# Patient Record
Sex: MALE | Race: OTHER | HISPANIC OR LATINO | Employment: UNEMPLOYED | URBAN - METROPOLITAN AREA
[De-identification: names, ages, dates, MRNs, and addresses within clinical notes are randomized per-mention and may not be internally consistent; named-entity substitution may affect disease eponyms.]

---

## 2019-10-26 ENCOUNTER — APPOINTMENT (EMERGENCY)
Dept: RADIOLOGY | Facility: HOSPITAL | Age: 54
End: 2019-10-26

## 2019-10-26 ENCOUNTER — HOSPITAL ENCOUNTER (OUTPATIENT)
Facility: HOSPITAL | Age: 54
Setting detail: OBSERVATION
Discharge: HOME | End: 2019-10-27
Attending: EMERGENCY MEDICINE | Admitting: FAMILY MEDICINE

## 2019-10-26 DIAGNOSIS — R10.9 ABDOMINAL PAIN: Primary | ICD-10-CM

## 2019-10-26 DIAGNOSIS — F17.200 NICOTINE DEPENDENCE: ICD-10-CM

## 2019-10-26 DIAGNOSIS — R10.13 EPIGASTRIC PAIN: ICD-10-CM

## 2019-10-26 DIAGNOSIS — R11.2 NAUSEA & VOMITING: ICD-10-CM

## 2019-10-26 PROBLEM — K29.50 CHRONIC GASTRITIS: Status: ACTIVE | Noted: 2019-10-26

## 2019-10-26 PROBLEM — R91.1 PULMONARY NODULE: Status: ACTIVE | Noted: 2019-10-26

## 2019-10-26 PROBLEM — J92.9 PLEURAL THICKENING: Status: ACTIVE | Noted: 2019-10-26

## 2019-10-26 PROBLEM — F12.10 MARIJUANA ABUSE: Status: ACTIVE | Noted: 2019-10-26

## 2019-10-26 PROBLEM — E66.9 OBESITY: Status: ACTIVE | Noted: 2019-10-26

## 2019-10-26 PROBLEM — R93.89 FILLING DEFECT ON IMAGING STUDY: Status: ACTIVE | Noted: 2019-10-26

## 2019-10-26 LAB
ALBUMIN SERPL BCP-MCNC: 3.7 G/DL (ref 3.5–5)
ALP SERPL-CCNC: 82 U/L (ref 46–116)
ALT SERPL W P-5'-P-CCNC: 32 U/L (ref 12–78)
AMPHETAMINES SERPL QL SCN: NEGATIVE
ANION GAP SERPL CALCULATED.3IONS-SCNC: 7 MMOL/L (ref 4–13)
APTT PPP: 27 SECONDS (ref 23–37)
AST SERPL W P-5'-P-CCNC: 20 U/L (ref 5–45)
BACTERIA UR QL AUTO: ABNORMAL /HPF
BARBITURATES UR QL: NEGATIVE
BASOPHILS # BLD AUTO: 0.05 THOUSANDS/ΜL (ref 0–0.1)
BASOPHILS NFR BLD AUTO: 1 % (ref 0–1)
BENZODIAZ UR QL: NEGATIVE
BILIRUB SERPL-MCNC: 0.3 MG/DL (ref 0.2–1)
BILIRUB UR QL STRIP: NEGATIVE
BUN SERPL-MCNC: 20 MG/DL (ref 5–25)
CALCIUM SERPL-MCNC: 8.7 MG/DL (ref 8.3–10.1)
CHLORIDE SERPL-SCNC: 102 MMOL/L (ref 100–108)
CHOLEST SERPL-MCNC: 119 MG/DL (ref 50–200)
CLARITY UR: CLEAR
CO2 SERPL-SCNC: 27 MMOL/L (ref 21–32)
COCAINE UR QL: NEGATIVE
COLOR UR: YELLOW
CREAT SERPL-MCNC: 0.88 MG/DL (ref 0.6–1.3)
EOSINOPHIL # BLD AUTO: 0.28 THOUSAND/ΜL (ref 0–0.61)
EOSINOPHIL NFR BLD AUTO: 3 % (ref 0–6)
ERYTHROCYTE [DISTWIDTH] IN BLOOD BY AUTOMATED COUNT: 13.7 % (ref 11.6–15.1)
GFR SERPL CREATININE-BSD FRML MDRD: 98 ML/MIN/1.73SQ M
GLUCOSE SERPL-MCNC: 119 MG/DL (ref 65–140)
GLUCOSE UR STRIP-MCNC: NEGATIVE MG/DL
HCT VFR BLD AUTO: 43.9 % (ref 36.5–49.3)
HDLC SERPL-MCNC: 29 MG/DL
HGB BLD-MCNC: 14 G/DL (ref 12–17)
HGB UR QL STRIP.AUTO: ABNORMAL
HYALINE CASTS #/AREA URNS LPF: ABNORMAL /LPF
IMM GRANULOCYTES # BLD AUTO: 0.03 THOUSAND/UL (ref 0–0.2)
IMM GRANULOCYTES NFR BLD AUTO: 0 % (ref 0–2)
INR PPP: 1.02 (ref 0.91–1.09)
KETONES UR STRIP-MCNC: NEGATIVE MG/DL
LDLC SERPL CALC-MCNC: 56 MG/DL (ref 0–100)
LEUKOCYTE ESTERASE UR QL STRIP: NEGATIVE
LIPASE SERPL-CCNC: 395 U/L (ref 73–393)
LYMPHOCYTES # BLD AUTO: 3.29 THOUSANDS/ΜL (ref 0.6–4.47)
LYMPHOCYTES NFR BLD AUTO: 33 % (ref 14–44)
MCH RBC QN AUTO: 29.4 PG (ref 26.8–34.3)
MCHC RBC AUTO-ENTMCNC: 31.9 G/DL (ref 31.4–37.4)
MCV RBC AUTO: 92 FL (ref 82–98)
METHADONE UR QL: NEGATIVE
MONOCYTES # BLD AUTO: 1.07 THOUSAND/ΜL (ref 0.17–1.22)
MONOCYTES NFR BLD AUTO: 11 % (ref 4–12)
MUCOUS THREADS UR QL AUTO: ABNORMAL
NEUTROPHILS # BLD AUTO: 5.12 THOUSANDS/ΜL (ref 1.85–7.62)
NEUTS SEG NFR BLD AUTO: 52 % (ref 43–75)
NITRITE UR QL STRIP: NEGATIVE
NON-SQ EPI CELLS URNS QL MICRO: ABNORMAL /HPF
NRBC BLD AUTO-RTO: 0 /100 WBCS
OPIATES UR QL SCN: POSITIVE
PCP UR QL: NEGATIVE
PH UR STRIP.AUTO: 7.5 [PH]
PLATELET # BLD AUTO: 291 THOUSANDS/UL (ref 149–390)
PMV BLD AUTO: 9.6 FL (ref 8.9–12.7)
POTASSIUM SERPL-SCNC: 4.2 MMOL/L (ref 3.5–5.3)
PROT SERPL-MCNC: 7.3 G/DL (ref 6.4–8.2)
PROT UR STRIP-MCNC: NEGATIVE MG/DL
PROTHROMBIN TIME: 11 SECONDS (ref 9.8–12)
RBC # BLD AUTO: 4.76 MILLION/UL (ref 3.88–5.62)
RBC #/AREA URNS AUTO: ABNORMAL /HPF
SODIUM SERPL-SCNC: 136 MMOL/L (ref 136–145)
SP GR UR STRIP.AUTO: 1.01 (ref 1–1.03)
THC UR QL: POSITIVE
TRIGL SERPL-MCNC: 170 MG/DL
TROPONIN I SERPL-MCNC: <0.02 NG/ML
UROBILINOGEN UR QL STRIP.AUTO: 0.2 E.U./DL
WBC # BLD AUTO: 9.84 THOUSAND/UL (ref 4.31–10.16)
WBC #/AREA URNS AUTO: ABNORMAL /HPF

## 2019-10-26 PROCEDURE — 74177 CT ABD & PELVIS W/CONTRAST: CPT

## 2019-10-26 PROCEDURE — 96375 TX/PRO/DX INJ NEW DRUG ADDON: CPT

## 2019-10-26 PROCEDURE — 85730 THROMBOPLASTIN TIME PARTIAL: CPT | Performed by: EMERGENCY MEDICINE

## 2019-10-26 PROCEDURE — 99220 PR INITIAL OBSERVATION CARE/DAY 70 MINUTES: CPT | Performed by: INTERNAL MEDICINE

## 2019-10-26 PROCEDURE — 94664 DEMO&/EVAL PT USE INHALER: CPT

## 2019-10-26 PROCEDURE — 83690 ASSAY OF LIPASE: CPT | Performed by: EMERGENCY MEDICINE

## 2019-10-26 PROCEDURE — 96361 HYDRATE IV INFUSION ADD-ON: CPT

## 2019-10-26 PROCEDURE — 83036 HEMOGLOBIN GLYCOSYLATED A1C: CPT | Performed by: NURSE PRACTITIONER

## 2019-10-26 PROCEDURE — 80053 COMPREHEN METABOLIC PANEL: CPT | Performed by: EMERGENCY MEDICINE

## 2019-10-26 PROCEDURE — 81001 URINALYSIS AUTO W/SCOPE: CPT | Performed by: NURSE PRACTITIONER

## 2019-10-26 PROCEDURE — 71260 CT THORAX DX C+: CPT

## 2019-10-26 PROCEDURE — 96374 THER/PROPH/DIAG INJ IV PUSH: CPT

## 2019-10-26 PROCEDURE — 36415 COLL VENOUS BLD VENIPUNCTURE: CPT | Performed by: EMERGENCY MEDICINE

## 2019-10-26 PROCEDURE — 71045 X-RAY EXAM CHEST 1 VIEW: CPT

## 2019-10-26 PROCEDURE — 84484 ASSAY OF TROPONIN QUANT: CPT | Performed by: EMERGENCY MEDICINE

## 2019-10-26 PROCEDURE — 96372 THER/PROPH/DIAG INJ SC/IM: CPT

## 2019-10-26 PROCEDURE — 80061 LIPID PANEL: CPT | Performed by: NURSE PRACTITIONER

## 2019-10-26 PROCEDURE — 99285 EMERGENCY DEPT VISIT HI MDM: CPT

## 2019-10-26 PROCEDURE — 80307 DRUG TEST PRSMV CHEM ANLYZR: CPT | Performed by: NURSE PRACTITIONER

## 2019-10-26 PROCEDURE — 85610 PROTHROMBIN TIME: CPT | Performed by: EMERGENCY MEDICINE

## 2019-10-26 PROCEDURE — 94640 AIRWAY INHALATION TREATMENT: CPT

## 2019-10-26 PROCEDURE — 94760 N-INVAS EAR/PLS OXIMETRY 1: CPT

## 2019-10-26 PROCEDURE — 85025 COMPLETE CBC W/AUTO DIFF WBC: CPT | Performed by: EMERGENCY MEDICINE

## 2019-10-26 PROCEDURE — 93005 ELECTROCARDIOGRAM TRACING: CPT

## 2019-10-26 RX ORDER — PANTOPRAZOLE SODIUM 40 MG/1
40 TABLET, DELAYED RELEASE ORAL
Status: DISCONTINUED | OUTPATIENT
Start: 2019-10-27 | End: 2019-10-26

## 2019-10-26 RX ORDER — SUCRALFATE ORAL 1 G/10ML
1000 SUSPENSION ORAL
Status: DISCONTINUED | OUTPATIENT
Start: 2019-10-26 | End: 2019-10-27 | Stop reason: HOSPADM

## 2019-10-26 RX ORDER — POLYETHYLENE GLYCOL 3350 17 G/17G
17 POWDER, FOR SOLUTION ORAL DAILY PRN
Status: DISCONTINUED | OUTPATIENT
Start: 2019-10-26 | End: 2019-10-27 | Stop reason: HOSPADM

## 2019-10-26 RX ORDER — ONDANSETRON 2 MG/ML
4 INJECTION INTRAMUSCULAR; INTRAVENOUS EVERY 6 HOURS PRN
Status: DISCONTINUED | OUTPATIENT
Start: 2019-10-26 | End: 2019-10-27 | Stop reason: HOSPADM

## 2019-10-26 RX ORDER — METOCLOPRAMIDE HYDROCHLORIDE 5 MG/ML
10 INJECTION INTRAMUSCULAR; INTRAVENOUS ONCE
Status: COMPLETED | OUTPATIENT
Start: 2019-10-26 | End: 2019-10-26

## 2019-10-26 RX ORDER — PROMETHAZINE HYDROCHLORIDE 25 MG/ML
12.5 INJECTION, SOLUTION INTRAMUSCULAR; INTRAVENOUS ONCE
Status: COMPLETED | OUTPATIENT
Start: 2019-10-26 | End: 2019-10-26

## 2019-10-26 RX ORDER — AMLODIPINE BESYLATE 5 MG/1
5 TABLET ORAL DAILY
Status: DISCONTINUED | OUTPATIENT
Start: 2019-10-27 | End: 2019-10-27 | Stop reason: HOSPADM

## 2019-10-26 RX ORDER — HYDROMORPHONE HCL/PF 1 MG/ML
1 SYRINGE (ML) INJECTION ONCE
Status: COMPLETED | OUTPATIENT
Start: 2019-10-26 | End: 2019-10-26

## 2019-10-26 RX ORDER — PANTOPRAZOLE SODIUM 40 MG/1
40 TABLET, DELAYED RELEASE ORAL
Status: DISCONTINUED | OUTPATIENT
Start: 2019-10-26 | End: 2019-10-27 | Stop reason: HOSPADM

## 2019-10-26 RX ORDER — DICYCLOMINE HYDROCHLORIDE 10 MG/1
20 CAPSULE ORAL
Status: DISCONTINUED | OUTPATIENT
Start: 2019-10-26 | End: 2019-10-27 | Stop reason: HOSPADM

## 2019-10-26 RX ORDER — ACETAMINOPHEN 325 MG/1
650 TABLET ORAL EVERY 6 HOURS PRN
Status: DISCONTINUED | OUTPATIENT
Start: 2019-10-26 | End: 2019-10-27 | Stop reason: HOSPADM

## 2019-10-26 RX ORDER — HYDROMORPHONE HCL/PF 1 MG/ML
0.5 SYRINGE (ML) INJECTION EVERY 6 HOURS PRN
Status: DISCONTINUED | OUTPATIENT
Start: 2019-10-26 | End: 2019-10-26

## 2019-10-26 RX ORDER — ALBUTEROL SULFATE 2.5 MG/3ML
2.5 SOLUTION RESPIRATORY (INHALATION) EVERY 6 HOURS PRN
Status: DISCONTINUED | OUTPATIENT
Start: 2019-10-26 | End: 2019-10-27 | Stop reason: HOSPADM

## 2019-10-26 RX ORDER — HEPARIN SODIUM 5000 [USP'U]/ML
5000 INJECTION, SOLUTION INTRAVENOUS; SUBCUTANEOUS EVERY 8 HOURS SCHEDULED
Status: DISCONTINUED | OUTPATIENT
Start: 2019-10-26 | End: 2019-10-27 | Stop reason: HOSPADM

## 2019-10-26 RX ORDER — LORAZEPAM 0.5 MG/1
0.5 TABLET ORAL EVERY 8 HOURS PRN
Status: DISCONTINUED | OUTPATIENT
Start: 2019-10-26 | End: 2019-10-27 | Stop reason: HOSPADM

## 2019-10-26 RX ORDER — MORPHINE SULFATE 4 MG/ML
4 INJECTION, SOLUTION INTRAMUSCULAR; INTRAVENOUS ONCE
Status: COMPLETED | OUTPATIENT
Start: 2019-10-26 | End: 2019-10-26

## 2019-10-26 RX ORDER — PROMETHAZINE HYDROCHLORIDE 25 MG/ML
12.5 INJECTION, SOLUTION INTRAMUSCULAR; INTRAVENOUS EVERY 8 HOURS PRN
Status: DISCONTINUED | OUTPATIENT
Start: 2019-10-26 | End: 2019-10-27 | Stop reason: HOSPADM

## 2019-10-26 RX ORDER — NICOTINE 21 MG/24HR
1 PATCH, TRANSDERMAL 24 HOURS TRANSDERMAL DAILY
Status: DISCONTINUED | OUTPATIENT
Start: 2019-10-26 | End: 2019-10-27 | Stop reason: HOSPADM

## 2019-10-26 RX ORDER — KETOROLAC TROMETHAMINE 30 MG/ML
30 INJECTION, SOLUTION INTRAMUSCULAR; INTRAVENOUS ONCE
Status: COMPLETED | OUTPATIENT
Start: 2019-10-26 | End: 2019-10-26

## 2019-10-26 RX ORDER — ONDANSETRON 2 MG/ML
4 INJECTION INTRAMUSCULAR; INTRAVENOUS ONCE
Status: COMPLETED | OUTPATIENT
Start: 2019-10-26 | End: 2019-10-26

## 2019-10-26 RX ORDER — KETOROLAC TROMETHAMINE 30 MG/ML
15 INJECTION, SOLUTION INTRAMUSCULAR; INTRAVENOUS EVERY 6 HOURS PRN
Status: DISCONTINUED | OUTPATIENT
Start: 2019-10-26 | End: 2019-10-26

## 2019-10-26 RX ORDER — MAGNESIUM HYDROXIDE/ALUMINUM HYDROXICE/SIMETHICONE 120; 1200; 1200 MG/30ML; MG/30ML; MG/30ML
30 SUSPENSION ORAL EVERY 4 HOURS PRN
Status: DISCONTINUED | OUTPATIENT
Start: 2019-10-26 | End: 2019-10-27 | Stop reason: HOSPADM

## 2019-10-26 RX ORDER — SODIUM CHLORIDE 9 MG/ML
100 INJECTION, SOLUTION INTRAVENOUS CONTINUOUS
Status: DISCONTINUED | OUTPATIENT
Start: 2019-10-26 | End: 2019-10-27

## 2019-10-26 RX ORDER — CLONIDINE HYDROCHLORIDE 0.1 MG/1
0.1 TABLET ORAL EVERY 8 HOURS SCHEDULED
Status: DISCONTINUED | OUTPATIENT
Start: 2019-10-26 | End: 2019-10-26

## 2019-10-26 RX ORDER — KETOROLAC TROMETHAMINE 30 MG/ML
15 INJECTION, SOLUTION INTRAMUSCULAR; INTRAVENOUS EVERY 6 HOURS PRN
Status: DISCONTINUED | OUTPATIENT
Start: 2019-10-26 | End: 2019-10-27 | Stop reason: HOSPADM

## 2019-10-26 RX ADMIN — HEPARIN SODIUM 5000 UNITS: 5000 INJECTION INTRAVENOUS; SUBCUTANEOUS at 22:04

## 2019-10-26 RX ADMIN — KETOROLAC TROMETHAMINE 30 MG: 30 INJECTION INTRAMUSCULAR; INTRAVENOUS at 14:13

## 2019-10-26 RX ADMIN — PROMETHAZINE HYDROCHLORIDE 12.5 MG: 25 INJECTION INTRAMUSCULAR; INTRAVENOUS at 20:17

## 2019-10-26 RX ADMIN — METOCLOPRAMIDE 10 MG: 5 INJECTION, SOLUTION INTRAMUSCULAR; INTRAVENOUS at 17:20

## 2019-10-26 RX ADMIN — SUCRALFATE 1000 MG: 1 SUSPENSION ORAL at 19:50

## 2019-10-26 RX ADMIN — DICYCLOMINE HYDROCHLORIDE 20 MG: 10 CAPSULE ORAL at 19:50

## 2019-10-26 RX ADMIN — HYDROMORPHONE HYDROCHLORIDE 1 MG: 1 INJECTION, SOLUTION INTRAMUSCULAR; INTRAVENOUS; SUBCUTANEOUS at 17:22

## 2019-10-26 RX ADMIN — ALBUTEROL SULFATE 2.5 MG: 2.5 SOLUTION RESPIRATORY (INHALATION) at 20:34

## 2019-10-26 RX ADMIN — ONDANSETRON 4 MG: 2 INJECTION INTRAMUSCULAR; INTRAVENOUS at 14:12

## 2019-10-26 RX ADMIN — IOHEXOL 100 ML: 350 INJECTION, SOLUTION INTRAVENOUS at 15:22

## 2019-10-26 RX ADMIN — SODIUM CHLORIDE 100 ML/HR: 0.9 INJECTION, SOLUTION INTRAVENOUS at 19:51

## 2019-10-26 RX ADMIN — MORPHINE SULFATE 4 MG: 4 INJECTION INTRAVENOUS at 16:02

## 2019-10-26 RX ADMIN — NICOTINE 1 PATCH: 21 PATCH, EXTENDED RELEASE TRANSDERMAL at 19:50

## 2019-10-26 RX ADMIN — PROMETHAZINE HYDROCHLORIDE 12.5 MG: 25 INJECTION INTRAMUSCULAR; INTRAVENOUS at 15:11

## 2019-10-26 RX ADMIN — SODIUM CHLORIDE 1000 ML: 0.9 INJECTION, SOLUTION INTRAVENOUS at 14:11

## 2019-10-26 RX ADMIN — PANTOPRAZOLE SODIUM 40 MG: 40 TABLET, DELAYED RELEASE ORAL at 20:17

## 2019-10-26 NOTE — ED NOTES
Pt ringing call bell asking for medication, c/o continued nausea & requesting pain medication  Pt specifically states he is "not looking for drugs"  Awaiting physician orders        Christina Arcos RN  10/26/19 Lois Severino 86 [address], TOMASZ  10/26/19 2339

## 2019-10-26 NOTE — ASSESSMENT & PLAN NOTE
Questionable cannabinoid hyperemesis syndrome vs opioid withdrawal as patient had diarrhea, vomiting, and diaphoresis   · Check a urine drug screen  · Clonidine 0 1 mg q8h, IVF, Ativan PRN   · Supportive care  · Counseled on marijuana cessation

## 2019-10-26 NOTE — ASSESSMENT & PLAN NOTE
CT chest: 3 mm noncalcified left upper lobe pulmonary nodule  Based on current Fleischner Society 2017 Guidelines on incidental pulmonary nodule, no routine follow-up is needed if the patient is considered low risk for lung cancer  If the patient is considered high risk for lung cancer, 12 month follow-up non-contrast chest CT is recommended    · Outpatient follow-up  · Nicotine cessation

## 2019-10-26 NOTE — ASSESSMENT & PLAN NOTE
Patient reports daily marijuana use at bedtime  · Check a urine drug screen  · Counseled on marijuana cessation

## 2019-10-26 NOTE — ASSESSMENT & PLAN NOTE
CTA chest done pelvis: 'Collateral vessels in the left posterior chest with questionable narrowing/filling defect of the left subclavian vein (series 2 image 5 through 7)   Consider further evaluation with vascular ultrasound to exclude filling defect/thrombus versus mixing artifact in this region '  · Patient with no discomfort or upper extremity swelling  · Will check a D-dimer  · Obtain left upper extremity venous duplex inpatient versus outpatient

## 2019-10-26 NOTE — ED PROVIDER NOTES
History  Chief Complaint   Patient presents with    Abdominal Pain     c/o 20 minute onset of abdominal pain radiating up into his chest   squad states "patient was passing out for a few seconds at a time" in the ambulance  Patient grunting loudly and hyperventilating on arrival   also c/o nausea   Chest Pain     Patient is a 50-year-old male reportedly lives [facility] it was driving home from [place] visiting his son and he developed severe epigastric abdominal pain that was associated with nausea but no vomiting  Patient was brought in by squad, the patient denies any chest pain or shortness of breath  Patient has a very vague history, he states he has had similar symptoms in the past has been seen in hospitals in [place] and in [place] for reported the a ruptured diverticulitis, possibly kidney stones he states he has history of severe reflux  Patient states he had coffee this morning when he started driving and he knew he was going to have a problem  The pain did not start right away if the coffee but soon afterwards  Patient is hyperventilating upon entrance to the emergency room  Prior to Admission Medications   Prescriptions Last Dose Informant Patient Reported? Taking? AMLODIPINE BESYLATE PO 10/25/2019 at Unknown time  Yes Yes   Sig: Take by mouth   LISINOPRIL PO 10/25/2019 at Unknown time  Yes Yes   Sig: Take by mouth   Naproxen Sodium (ALEVE PO) 10/25/2019 at Unknown time  Yes Yes   Sig: Take by mouth daily   PANTOPRAZOLE SODIUM PO 10/25/2019 at Unknown time  Yes Yes   Sig: Take by mouth      Facility-Administered Medications: None       Past Medical History:   Diagnosis Date    Arthritis     Asthma     Hypertension        Past Surgical History:   Procedure Laterality Date    ABDOMINAL SURGERY         History reviewed  No pertinent family history  I have reviewed and agree with the history as documented      Social History     Tobacco Use    Smoking status: Current Every Day Smoker     Packs/day: 1 00     Types: Cigarettes    Smokeless tobacco: Current User   Substance Use Topics    Alcohol use: Never     Frequency: Never    Drug use: Yes     Types: Marijuana     Comment: medicinal marijuana daily        Review of Systems   Constitutional: Positive for diaphoresis  Negative for fever  HENT: Negative for facial swelling and trouble swallowing  Eyes: Negative for photophobia and visual disturbance  Respiratory: Negative for chest tightness and shortness of breath  Cardiovascular: Negative for chest pain, palpitations and leg swelling  Gastrointestinal: Positive for abdominal pain and nausea  Negative for vomiting  Genitourinary: Negative for dysuria and flank pain  Musculoskeletal: Negative for back pain and neck pain  Skin: Negative  Neurological: Negative for weakness and numbness  Hematological: Negative  Psychiatric/Behavioral: Negative          Physical Exam  Physical Exam    Vital Signs  ED Triage Vitals [10/26/19 1407]   Temperature Pulse Respirations Blood Pressure SpO2   (!) 97 °F (36 1 °C) (!) 110 (!) 36 (!) 151/110 96 %      Temp Source Heart Rate Source Patient Position - Orthostatic VS BP Location FiO2 (%)   Tympanic Monitor Lying Right arm --      Pain Score       Worst Possible Pain           Vitals:    10/26/19 1820 10/26/19 2037 10/26/19 2316 10/27/19 0803   BP: 135/90  119/84 125/78   Pulse: 96 94 79 86   Patient Position - Orthostatic VS:    Lying         Visual Acuity      ED Medications  Medications   amLODIPine (NORVASC) tablet 5 mg (5 mg Oral Not Given 10/27/19 0913)   ondansetron (ZOFRAN) injection 4 mg (4 mg Intravenous Given 10/27/19 0917)   heparin (porcine) subcutaneous injection 5,000 Units (5,000 Units Subcutaneous Given 10/27/19 0615)   acetaminophen (TYLENOL) tablet 650 mg (has no administration in time range)   polyethylene glycol (MIRALAX) packet 17 g (has no administration in time range)   nicotine (NICODERM CQ) 21 mg/24 hr TD 24 hr patch 1 patch (1 patch Transdermal Medication Applied 10/27/19 0917)   sucralfate (CARAFATE) oral suspension 1,000 mg (1,000 mg Oral Given 10/27/19 0615)   promethazine (PHENERGAN) injection 12 5 mg (12 5 mg Intravenous Given 10/26/19 2017)   ketorolac (TORADOL) injection 15 mg (15 mg Intravenous Given 10/27/19 0855)   dicyclomine (BENTYL) capsule 20 mg (20 mg Oral Given 10/27/19 0615)   aluminum-magnesium hydroxide-simethicone (MYLANTA) 592-073-31 mg/5 mL oral suspension 30 mL (30 mL Oral Given 10/27/19 0304)   LORazepam (ATIVAN) tablet 0 5 mg (0 5 mg Oral Not Given 10/27/19 0942)   pantoprazole (PROTONIX) EC tablet 40 mg (40 mg Oral Given 10/27/19 0615)   albuterol inhalation solution 2 5 mg (2 5 mg Nebulization Given 10/26/19 2034)   sodium chloride 0 9 % bolus 1,000 mL (0 mL Intravenous Stopped 10/26/19 1723)   ketorolac (TORADOL) injection 30 mg (30 mg Intravenous Given 10/26/19 1413)   ondansetron (ZOFRAN) injection 4 mg (4 mg Intravenous Given 10/26/19 1412)   promethazine (PHENERGAN) injection 12 5 mg (12 5 mg Intramuscular Given 10/26/19 1511)   iohexol (OMNIPAQUE) 350 MG/ML injection (MULTI-DOSE) 100 mL (100 mL Intravenous Given 10/26/19 1522)   morphine (PF) 4 mg/mL injection 4 mg (4 mg Intravenous Given 10/26/19 1602)   HYDROmorphone (DILAUDID) injection 1 mg (1 mg Intravenous Given 10/26/19 1722)   metoclopramide (REGLAN) injection 10 mg (10 mg Intravenous Given 10/26/19 1720)       Diagnostic Studies  Results Reviewed     Procedure Component Value Units Date/Time    Lipase [[id number]]  (Normal) Collected:  10/27/19 0535    Lab Status:  Final result Specimen:  Blood from Arm, Right Updated:  10/27/19 0634     Lipase 137 u/L     Amylase [[id number]]  (Normal) Collected:  10/27/19 0535    Lab Status:  Final result Specimen:  Blood from Arm, Right Updated:  10/27/19 0634     Amylase 64 IU/L     Hemoglobin A1C [[id number]] Collected:  10/26/19 1410    Lab Status:  Final result Specimen:  Blood from Arm, Left Updated:  10/27/19 0123     Hemoglobin A1C 6 2 %       mg/dl     Rapid drug screen, urine [[id number]]  (Abnormal) Collected:  10/26/19 2006    Lab Status:  Final result Specimen:  Urine, Other Updated:  10/26/19 2027     Amph/Meth UR Negative     Barbiturate Ur Negative     Benzodiazepine Urine Negative     Cocaine Urine Negative     Methadone Urine Negative     Opiate Urine Positive     PCP Ur Negative     THC Urine Positive    Narrative:       Presumptive report  If requested, specimen will be sent to reference lab for confirmation  FOR MEDICAL PURPOSES ONLY  IF CONFIRMATION NEEDED PLEASE CONTACT THE LAB WITHIN 5 DAYS      Drug Screen Cutoff Levels:  AMPHETAMINE/METHAMPHETAMINES  1000 ng/mL  BARBITURATES     200 ng/mL  BENZODIAZEPINES     200 ng/mL  COCAINE      300 ng/mL  METHADONE      300 ng/mL  OPIATES      300 ng/mL  PHENCYCLIDINE     25 ng/mL  THC       50 ng/mL      Urinalysis with microscopic [[id number]]  (Abnormal) Collected:  10/26/19 2006    Lab Status:  Final result Specimen:  Urine, Other Updated:  10/26/19 2022     Clarity, UA Clear     Color, UA Yellow     Specific Gravity, UA 1 010     pH, UA 7 5     Glucose, UA Negative mg/dl      Ketones, UA Negative mg/dl      Blood, UA Small     Protein, UA Negative mg/dl      Nitrite, UA Negative     Bilirubin, UA Negative     Urobilinogen, UA 0 2 E U /dl      Leukocytes, UA Negative     WBC, UA 0-1 /hpf      RBC, UA 4-10 /hpf      Hyaline Casts, UA 1-2 /lpf      Bacteria, UA Occasional /hpf      Epithelial Cells Occasional /hpf      MUCUS THREADS Occasional    Lipid Panel with Direct LDL reflex [[id number]]  (Abnormal) Collected:  10/26/19 1410    Lab Status:  Final result Specimen:  Blood from Arm, Left Updated:  10/26/19 1855     Cholesterol 119 mg/dL      Triglycerides 170 mg/dL      HDL, Direct 29 mg/dL      LDL Calculated 56 mg/dL     Troponin I [[id number]]  (Normal) Collected:  10/26/19 1410    Lab Status:  Final result Specimen:  Blood from Arm, Left Updated:  10/26/19 1434     Troponin I <0 02 ng/mL     APTT [[id number]]  (Normal) Collected:  10/26/19 1410    Lab Status:  Final result Specimen:  Blood from Arm, Left Updated:  10/26/19 1432     PTT 27 seconds     Protime-INR [[id number]]  (Normal) Collected:  10/26/19 1410    Lab Status:  Final result Specimen:  Blood from Arm, Left Updated:  10/26/19 1432     Protime 11 0 seconds      INR 1 02    Comprehensive metabolic panel [[id number]] Collected:  10/26/19 1410    Lab Status:  Final result Specimen:  Blood from Arm, Left Updated:  10/26/19 1431     Sodium 136 mmol/L      Potassium 4 2 mmol/L      Chloride 102 mmol/L      CO2 27 mmol/L      ANION GAP 7 mmol/L      BUN 20 mg/dL      Creatinine 0 88 mg/dL      Glucose 119 mg/dL      Calcium 8 7 mg/dL      AST 20 U/L      ALT 32 U/L      Alkaline Phosphatase 82 U/L      Total Protein 7 3 g/dL      Albumin 3 7 g/dL      Total Bilirubin 0 30 mg/dL      eGFR 98 ml/min/1 73sq m     Narrative:       [place] guidelines for Chronic Kidney Disease (CKD):     Stage 1 with normal or high GFR (GFR > 90 mL/min/1 73 square meters)    Stage 2 Mild CKD (GFR = 60-89 mL/min/1 73 square meters)    Stage 3A Moderate CKD (GFR = 45-59 mL/min/1 73 square meters)    Stage 3B Moderate CKD (GFR = 30-44 mL/min/1 73 square meters)    Stage 4 Severe CKD (GFR = 15-29 mL/min/1 73 square meters)    Stage 5 End Stage CKD (GFR <15 mL/min/1 73 square meters)  Note: GFR calculation is accurate only with a steady state creatinine    Lipase [[id number]]  (Abnormal) Collected:  10/26/19 1410    Lab Status:  Final result Specimen:  Blood from Arm, Left Updated:  10/26/19 1431     Lipase 395 u/L     CBC and differential [[id number]] Collected:  10/26/19 1410    Lab Status:  Final result Specimen:  Blood from Arm, Left Updated:  10/26/19 1414     WBC 9 84 Thousand/uL      RBC 4 76 Million/uL      Hemoglobin 14 0 g/dL      Hematocrit 43 9 %      MCV 92 fL      MCH 29 4 pg      MCHC 31 9 g/dL      RDW 13 7 %      MPV 9 6 fL      Platelets 893 Thousands/uL      nRBC 0 /100 WBCs      Neutrophils Relative 52 %      Immat GRANS % 0 %      Lymphocytes Relative 33 %      Monocytes Relative 11 %      Eosinophils Relative 3 %      Basophils Relative 1 %      Neutrophils Absolute 5 12 Thousands/µL      Immature Grans Absolute 0 03 Thousand/uL      Lymphocytes Absolute 3 29 Thousands/µL      Monocytes Absolute 1 07 Thousand/µL      Eosinophils Absolute 0 28 Thousand/µL      Basophils Absolute 0 05 Thousands/µL                  CT chest abdomen pelvis w contrast   Final Result by Heidi Mariee MD (10/26 6304)   1  3 mm noncalcified left upper lobe pulmonary nodule  Based on current Fleischner Society 2017 Guidelines on incidental pulmonary nodule, no routine follow-up is needed if the patient is considered low risk for lung cancer  If the patient is considered    high risk for lung cancer, 12 month follow-up non-contrast chest CT is recommended  2  Colonic diverticulosis without evidence of acute diverticulitis  3  Focal areas of pleural thickening within the posterior aspect of the bilateral upper lobes may represent sequela of prior asbestos exposure  Clinical correlation is recommended  4  Collateral vessels in the left posterior chest with questionable narrowing/filling defect of the left subclavian vein (series 2 image 5 through 7)  Consider further evaluation with vascular ultrasound to exclude filling defect/thrombus versus mixing    artifact in this region  5  Punctate 2 mm nonobstructing left renal calculus  The study was marked in EPIC for significant notification  Workstation performed: [id number]         XR chest 1 view portable   Final Result by Paloma Campbell MD (10/27 0700)      No acute cardiopulmonary disease              Workstation performed: [id number]         VAS upper limb venous duplex scan, unilateral/limited (Results Pending)              Procedures  ECG 12 Lead Documentation Only  Date/Time: 10/26/2019 2:09 PM  Performed by: Christi Aparicio MD  Authorized by: Christi Aparicio MD     Indications / Diagnosis:  Epigastric pain  Patient location:  ED  Previous ECG:     Previous ECG:  Unavailable    Comparison to cardiac monitor: Yes    Interpretation:     Interpretation: abnormal    Rate:     ECG rate:  108    ECG rate assessment: tachycardic    Rhythm:     Rhythm: sinus rhythm    Ectopy:     Ectopy: none    QRS:     QRS axis:  Normal    QRS intervals:  Normal  Conduction:     Conduction: normal    ST segments:     ST segments:  Normal  T waves:     T waves: normal             ED Course                   Initial Sepsis Screening     Row Name 10/26/19 1455                Is the patient's history suggestive of a new or worsening infection? No  -RG        Suspected source of infection          Are two or more of the following signs & symptoms of infection both present and new to the patient?         Indicate SIRS criteria          If the answer is yes to both questions, suspicion of sepsis is present          If severe sepsis is present AND tissue hypoperfusion perists in the hour after fluid resuscitation or lactate > 4, the patient meets criteria for SEPTIC SHOCK          Are any of the following organ dysfunction criteria present within 6 hours of suspected infection and SIRS criteria that are NOT considered to be chronic conditions?         Organ dysfunction          Date of presentation of severe sepsis          Time of presentation of severe sepsis          Tissue hypoperfusion persists in the hour after crystalloid fluid administration, evidenced, by either:          Was hypotension present within one hour of the conclusion of crystalloid fluid administration?           Date of presentation of septic shock          Time of presentation of septic shock            User Key  (r) = Recorded By, (t) = Taken By, (c) = Cosigned By    [address] Name Provider Type    Paulette Ahumada MD Physician                  MDM  Number of Diagnoses or Management Options  Abdominal pain:   Nausea & vomiting:   Diagnosis management comments: Patient's lab work was essentially negative except for a mildly elevated lipase  The patient has had multiple admissions in a [facility] he continues to be diaphoretic and showing signs of extreme distress  He was medicated multiple times by me in the emergency room with only minimal results  Therefore the patient is going to be admitted to the hospital for observation and further treatment and evaluation  I discussed this with the patient I answered all questions to the best my ability, patient states understanding and is in agreement with the assessment plan  Amount and/or Complexity of Data Reviewed  Clinical lab tests: ordered and reviewed  Tests in the radiology section of CPT®: ordered and reviewed        Disposition  Final diagnoses:   Abdominal pain   Nausea & vomiting     Time reflects when diagnosis was documented in both MDM as applicable and the Disposition within this note     Time User Action Codes Description Comment    10/26/2019  5:08 PM Hi Yunier Add [R10 9] Abdominal pain     10/26/2019  5:09 PM Hi Yunier Add [R11 2] Nausea & vomiting       ED Disposition     ED Disposition Condition Date/Time Comment    Admit Stable Sat Oct 26, 2019  5:08 PM Case was discussed with Dr Vidya Villalba and the patient's admission status was agreed to be Admission Status: observation status to the service of Dr Francisco J Adames   Follow-up Information    None         Current Discharge Medication List      CONTINUE these medications which have NOT CHANGED    Details   AMLODIPINE BESYLATE PO Take by mouth      LISINOPRIL PO Take by mouth      Naproxen Sodium (ALEVE PO) Take by mouth daily      PANTOPRAZOLE SODIUM PO Take by mouth           No discharge procedures on file      ED Provider  Electronically Signed by           Dione Back MD  10/27/19 8876

## 2019-10-26 NOTE — ASSESSMENT & PLAN NOTE
CTA chest and pelvis: 'Focal areas of pleural thickening within the posterior aspect of the bilateral upper lobes may represent sequela of prior asbestos exposure   Clinical correlation is recommended '  · Outpatient follow-up  · Smoking cessation education and counseling

## 2019-10-26 NOTE — H&P
H&P- [facility] 1965, 49 y o  male MRN: [id number]    Unit/Bed#: 2 [address] Encounter: [id number]    Primary Care Provider: Aris Jonas MD   Date and time admitted to hospital: 10/26/2019  1:59 PM        * Epigastric pain  Assessment & Plan  CTA chest abdomen pelvis:  Colonic diverticulosis without evidence of acute diverticulitis  2 mm nonobstructing left renal calculus  Patient with a hst of abdominal pain and drug seeking behavior   Has had 20 ED in visits in [place] for abdominal pain since from July 2017   1 episode of vomiting in ED  Lipase 395  Possible opioid withdrawal   · Full liquids  · IVF  · Scheduled Bentyl and Clonidine 0 1 mg po q8h  · Ativan PRN  · Check a UA, urine drug screen, PVR  · Check lipase, amylase, and CMP in am   · Protonix 40 BID and Carafate 1gm QID   · Supportive care with IVF, IV Zofran, IV Phenergan if no relief with Zofran, and IV Toradol PRN mod to severe pain     Nausea & vomiting  Assessment & Plan  Questionable cannabinoid hyperemesis syndrome vs opioid withdrawal as patient had diarrhea, vomiting, and diaphoresis   · Check a urine drug screen  · Clonidine 0 1 mg q8h, IVF, Ativan PRN   · Supportive care  · Counseled on marijuana cessation    Chronic gastritis  Assessment & Plan  Continue Protonix 40 mg BID with meals and Carafate 1gm QID    Filling defect on imaging study  Assessment & Plan  CTA chest done pelvis: 'Collateral vessels in the left posterior chest with questionable narrowing/filling defect of the left subclavian vein (series 2 image 5 through 7)   Consider further evaluation with vascular ultrasound to exclude filling defect/thrombus versus mixing artifact in this region '  · Patient with no discomfort or upper extremity swelling  · Will check a D-dimer  · Obtain left upper extremity venous duplex inpatient versus outpatient    Obesity  Assessment & Plan  Would benefit from weight loss  · Check A1c and lipid panel    Pleural thickening  Assessment & Plan  CTA chest and pelvis: 'Focal areas of pleural thickening within the posterior aspect of the bilateral upper lobes may represent sequela of prior asbestos exposure  Clinical correlation is recommended '  · Outpatient follow-up  · Smoking cessation education and counseling    Marijuana abuse  Assessment & Plan  Patient reports daily marijuana use at bedtime  · Check a urine drug screen  · Counseled on marijuana cessation      Nicotine dependence  Assessment & Plan  · Smoking cessation education and counseling  · Nicotine patch    Pulmonary nodule  Assessment & Plan  CT chest: 3 mm noncalcified left upper lobe pulmonary nodule  Based on current Fleischner Society 2017 Guidelines on incidental pulmonary nodule, no routine follow-up is needed if the patient is considered low risk for lung cancer  If the patient is considered high risk for lung cancer, 12 month follow-up non-contrast chest CT is recommended  · Outpatient follow-up  · Nicotine cessation      VTE Prophylaxis: Heparin  / reason for no mechanical VTE prophylaxis on heparin    Code Status: Full code- level 1  POLST: POLST form is not discussed and not completed at this time  Discussion with family: None present     Anticipated Length of Stay:  Patient will be admitted on an Observation basis with an anticipated length of stay of  Less than 2 midnights  Justification for Hospital Stay: epigastric pain    Total Time for Visit, including Counseling / Coordination of Care: 1 hour  Greater than 50% of this total time spent on direct patient counseling and coordination of care  Chief Complaint:   Epigastric pain    History of Present Illness:    Nusrat Fu is a 49 y o  male with a past medical history including marijuana dependence, nicotine dependence, chronic abdominal pain, gastritis, obesity and drug-seeking behavior who presents with acute onset of epigastric pain and recurrent vomiting    Patient states he was driving up from [facility] when he started to experience diarrhea for which he took Imodium  He was able to eat without nausea or vomiting  He said he developed abdominal pain for which he thought food would help  He was standing in line at [facility] when he suddenly felt very ill and a bystander offered to call EMS  Patient arrived to the emergency department in distress with diaphoresis, tachycardia, tachypnea, and hypertension  He had an episode of vomiting which was bright yellow in nature  He was given IV antiemetics and IV opioids for abdominal pain and vomiting  He denies any headache, dizziness, lightheadedness, chest pain, shortness of breath, or difficulty urinating  Patient resides in [place] but often drive to [place] for work  He gets most of his healthcare in [place]  He has had 20 emergency department visits since July 2017 for complaints of abdominal pain  Review of Systems:    Review of Systems   Constitutional: Negative for appetite change, chills and fever  HENT: Negative for congestion, postnasal drip, rhinorrhea and sore throat  Eyes: Negative for photophobia and visual disturbance  Respiratory: Negative for cough, chest tightness, shortness of breath and wheezing  Cardiovascular: Negative for chest pain, palpitations and leg swelling  Gastrointestinal: Positive for abdominal pain, diarrhea, nausea and vomiting  Negative for abdominal distention and constipation  Genitourinary: Negative for difficulty urinating, dysuria and hematuria  Musculoskeletal: Negative for arthralgias, gait problem and myalgias  Skin: Negative for rash and wound  Neurological: Negative for dizziness, weakness, light-headedness, numbness and headaches  Psychiatric/Behavioral: Negative for confusion  The patient is not nervous/anxious          Past Medical and Surgical History:     Past Medical History:   Diagnosis Date    Arthritis     Asthma     Hypertension        Past Surgical History: Procedure Laterality Date    ABDOMINAL SURGERY         Meds/Allergies:    Prior to Admission medications    Medication Sig Start Date End Date Taking? Authorizing Provider   AMLODIPINE BESYLATE PO Take by mouth   Yes Historical Provider, MD   LISINOPRIL PO Take by mouth   Yes Historical Provider, MD   Naproxen Sodium (ALEVE PO) Take by mouth daily   Yes Historical Provider, MD   PANTOPRAZOLE SODIUM PO Take by mouth   Yes Historical Provider, MD     I have reviewed home medications with patient personally  Allergies: No Known Allergies    Social History:     Marital Status: Single   Occupation: Disabled   Patient Pre-hospital Living Situation: Home  Patient Pre-hospital Level of Mobility: Independent   Patient Pre-hospital Diet Restrictions: None  Substance Use History:   Social History     Substance and Sexual Activity   Alcohol Use Never    Frequency: Never     Social History     Tobacco Use   Smoking Status Current Every Day Smoker    Packs/day: 1 00    Types: Cigarettes   Smokeless Tobacco Current User     Social History     Substance and Sexual Activity   Drug Use Yes    Types: Marijuana    Comment: medicinal marijuana daily       Family History:    History reviewed  No pertinent family history  Physical Exam:     Vitals:   Blood Pressure: 135/90 (10/26/19 1820)  Pulse: 96 (10/26/19 1820)  Temperature: 98 1 °F (36 7 °C) (10/26/19 1820)  Temp Source: Tympanic (10/26/19 1407)  Respirations: 18 (10/26/19 1820)  SpO2: 95 % (10/26/19 1820)    Physical Exam   Constitutional: He is oriented to person, place, and time  He appears well-developed and well-nourished  He is cooperative  No distress  HENT:   Head: Normocephalic  Mouth/Throat: Oropharynx is clear and moist  Mucous membranes are dry  Eyes: Pupils are equal, round, and reactive to light  Conjunctivae and EOM are normal  Right eye exhibits no discharge  Left eye exhibits no discharge  No scleral icterus  Neck: Normal range of motion   Neck supple  No JVD present  Cardiovascular: Normal rate, regular rhythm, normal heart sounds and intact distal pulses  No murmur heard  Pulmonary/Chest: Effort normal  No accessory muscle usage  No tachypnea  No respiratory distress  He has decreased breath sounds in the right lower field and the left lower field  He has no wheezes  He has no rhonchi  He has no rales  Abdominal: Soft  Bowel sounds are normal  He exhibits no distension  There is tenderness  There is no rebound and no guarding  Musculoskeletal: Normal range of motion  He exhibits no edema or tenderness  Neurological: He is alert and oriented to person, place, and time  He has normal reflexes  No cranial nerve deficit  Skin: Skin is warm  Capillary refill takes less than 2 seconds  No rash noted  He is diaphoretic  No erythema  Psychiatric: He has a normal mood and affect  His behavior is normal  Judgment and thought content normal    Nursing note and vitals reviewed  Additional Data:     Lab Results: I have personally reviewed pertinent reports  Results from last 7 days   Lab Units 10/26/19  1410   WBC Thousand/uL 9 84   HEMOGLOBIN g/dL 14 0   HEMATOCRIT % 43 9   PLATELETS Thousands/uL 291   NEUTROS PCT % 52   LYMPHS PCT % 33   MONOS PCT % 11   EOS PCT % 3     Results from last 7 days   Lab Units 10/26/19  1410   SODIUM mmol/L 136   POTASSIUM mmol/L 4 2   CHLORIDE mmol/L 102   CO2 mmol/L 27   BUN mg/dL 20   CREATININE mg/dL 0 88   ANION GAP mmol/L 7   CALCIUM mg/dL 8 7   ALBUMIN g/dL 3 7   TOTAL BILIRUBIN mg/dL 0 30   ALK PHOS U/L 82   ALT U/L 32   AST U/L 20   GLUCOSE RANDOM mg/dL 119     Results from last 7 days   Lab Units 10/26/19  1410   INR  1 02                   Imaging: I have personally reviewed pertinent reports  CT chest abdomen pelvis w contrast   Final Result by Syeda Hutton MD (10/26 4513)   1  3 mm noncalcified left upper lobe pulmonary nodule   Based on current Fleischner Society 2017 Guidelines on incidental pulmonary nodule, no routine follow-up is needed if the patient is considered low risk for lung cancer  If the patient is considered    high risk for lung cancer, 12 month follow-up non-contrast chest CT is recommended  2  Colonic diverticulosis without evidence of acute diverticulitis  3  Focal areas of pleural thickening within the posterior aspect of the bilateral upper lobes may represent sequela of prior asbestos exposure  Clinical correlation is recommended  4  Collateral vessels in the left posterior chest with questionable narrowing/filling defect of the left subclavian vein (series 2 image 5 through 7)  Consider further evaluation with vascular ultrasound to exclude filling defect/thrombus versus mixing    artifact in this region  5  Punctate 2 mm nonobstructing left renal calculus  The study was marked in EPIC for significant notification  Workstation performed: [id number]         XR chest 1 view portable    (Results Pending)   VAS lower limb venous duplex study, unilateral/limited    (Results Pending)       EKG, Pathology, and Other Studies Reviewed on Admission:   · EKG: Sinus tachycardia     Allscripts / Epic Records Reviewed: Yes     ** Please Note: This note has been constructed using a voice recognition system   **

## 2019-10-26 NOTE — ED NOTES
Pt call bell answered by ER tech Jneelle, pt c/o pain & requesting medication  Dr Edilberto Finch notified        Selwyn Mccoy RN  10/26/19 8951

## 2019-10-26 NOTE — ASSESSMENT & PLAN NOTE
CTA chest abdomen pelvis:  Colonic diverticulosis without evidence of acute diverticulitis  2 mm nonobstructing left renal calculus    Patient with a hst of abdominal pain and drug seeking behavior   Has had 20 ED in visits in [place] for abdominal pain since from July 2017   1 episode of vomiting in ED  Lipase 395  Possible opioid withdrawal   · Full liquids  · IVF  · Scheduled Bentyl and Clonidine 0 1 mg po q8h  · Ativan PRN  · Check a UA, urine drug screen, PVR  · Check lipase, amylase, and CMP in am   · Protonix 40 BID and Carafate 1gm QID   · Supportive care with IVF, IV Zofran, IV Phenergan if no relief with Zofran, and IV Toradol PRN mod to severe pain

## 2019-10-27 VITALS
RESPIRATION RATE: 18 BRPM | WEIGHT: 261.02 LBS | HEART RATE: 86 BPM | OXYGEN SATURATION: 95 % | TEMPERATURE: 98.2 F | DIASTOLIC BLOOD PRESSURE: 78 MMHG | SYSTOLIC BLOOD PRESSURE: 125 MMHG | BODY MASS INDEX: 39.56 KG/M2 | HEIGHT: 68 IN

## 2019-10-27 LAB
ALBUMIN SERPL BCP-MCNC: 3.1 G/DL (ref 3.5–5)
ALP SERPL-CCNC: 64 U/L (ref 46–116)
ALT SERPL W P-5'-P-CCNC: 24 U/L (ref 12–78)
AMYLASE SERPL-CCNC: 64 IU/L (ref 25–115)
ANION GAP SERPL CALCULATED.3IONS-SCNC: 2 MMOL/L (ref 4–13)
AST SERPL W P-5'-P-CCNC: 23 U/L (ref 5–45)
BILIRUB SERPL-MCNC: 0.4 MG/DL (ref 0.2–1)
BUN SERPL-MCNC: 15 MG/DL (ref 5–25)
CALCIUM SERPL-MCNC: 8.2 MG/DL (ref 8.3–10.1)
CHLORIDE SERPL-SCNC: 103 MMOL/L (ref 100–108)
CO2 SERPL-SCNC: 33 MMOL/L (ref 21–32)
CREAT SERPL-MCNC: 0.93 MG/DL (ref 0.6–1.3)
ERYTHROCYTE [DISTWIDTH] IN BLOOD BY AUTOMATED COUNT: 13.7 % (ref 11.6–15.1)
EST. AVERAGE GLUCOSE BLD GHB EST-MCNC: 131 MG/DL
GFR SERPL CREATININE-BSD FRML MDRD: 93 ML/MIN/1.73SQ M
GLUCOSE SERPL-MCNC: 127 MG/DL (ref 65–140)
HBA1C MFR BLD: 6.2 % (ref 4.2–6.3)
HCT VFR BLD AUTO: 39.7 % (ref 36.5–49.3)
HGB BLD-MCNC: 12.2 G/DL (ref 12–17)
LIPASE SERPL-CCNC: 137 U/L (ref 73–393)
MAGNESIUM SERPL-MCNC: 2.4 MG/DL (ref 1.6–2.6)
MCH RBC QN AUTO: 29.1 PG (ref 26.8–34.3)
MCHC RBC AUTO-ENTMCNC: 30.7 G/DL (ref 31.4–37.4)
MCV RBC AUTO: 95 FL (ref 82–98)
PHOSPHATE SERPL-MCNC: 2.8 MG/DL (ref 2.7–4.5)
PLATELET # BLD AUTO: 231 THOUSANDS/UL (ref 149–390)
PMV BLD AUTO: 9.6 FL (ref 8.9–12.7)
POTASSIUM SERPL-SCNC: 4.5 MMOL/L (ref 3.5–5.3)
PROT SERPL-MCNC: 6.2 G/DL (ref 6.4–8.2)
RBC # BLD AUTO: 4.19 MILLION/UL (ref 3.88–5.62)
SODIUM SERPL-SCNC: 138 MMOL/L (ref 136–145)
TSH SERPL DL<=0.05 MIU/L-ACNC: 1.1 UIU/ML (ref 0.36–3.74)
WBC # BLD AUTO: 9.84 THOUSAND/UL (ref 4.31–10.16)

## 2019-10-27 PROCEDURE — 82150 ASSAY OF AMYLASE: CPT | Performed by: NURSE PRACTITIONER

## 2019-10-27 PROCEDURE — 83690 ASSAY OF LIPASE: CPT | Performed by: NURSE PRACTITIONER

## 2019-10-27 PROCEDURE — 99217 PR OBSERVATION CARE DISCHARGE MANAGEMENT: CPT | Performed by: NURSE PRACTITIONER

## 2019-10-27 PROCEDURE — 84443 ASSAY THYROID STIM HORMONE: CPT | Performed by: NURSE PRACTITIONER

## 2019-10-27 PROCEDURE — 80053 COMPREHEN METABOLIC PANEL: CPT | Performed by: NURSE PRACTITIONER

## 2019-10-27 PROCEDURE — 83735 ASSAY OF MAGNESIUM: CPT | Performed by: NURSE PRACTITIONER

## 2019-10-27 PROCEDURE — 85027 COMPLETE CBC AUTOMATED: CPT | Performed by: NURSE PRACTITIONER

## 2019-10-27 PROCEDURE — 84100 ASSAY OF PHOSPHORUS: CPT | Performed by: NURSE PRACTITIONER

## 2019-10-27 RX ORDER — PANTOPRAZOLE SODIUM 40 MG/1
40 TABLET, DELAYED RELEASE ORAL
Qty: 30 TABLET | Refills: 1 | Status: SHIPPED | OUTPATIENT
Start: 2019-10-27

## 2019-10-27 RX ORDER — ACETAMINOPHEN 325 MG/1
650 TABLET ORAL EVERY 6 HOURS PRN
Qty: 30 TABLET | Refills: 0
Start: 2019-10-27

## 2019-10-27 RX ORDER — DICYCLOMINE HYDROCHLORIDE 10 MG/1
20 CAPSULE ORAL 4 TIMES DAILY PRN
Qty: 30 CAPSULE | Refills: 0 | Status: SHIPPED | OUTPATIENT
Start: 2019-10-27

## 2019-10-27 RX ORDER — NICOTINE 21 MG/24HR
1 PATCH, TRANSDERMAL 24 HOURS TRANSDERMAL DAILY
Qty: 28 PATCH | Refills: 0 | Status: SHIPPED | OUTPATIENT
Start: 2019-10-28

## 2019-10-27 RX ORDER — ALBUTEROL SULFATE 90 UG/1
2 AEROSOL, METERED RESPIRATORY (INHALATION) EVERY 4 HOURS PRN
Qty: 8.5 G | Refills: 1 | Status: SHIPPED | OUTPATIENT
Start: 2019-10-27

## 2019-10-27 RX ORDER — SUCRALFATE ORAL 1 G/10ML
1000 SUSPENSION ORAL
Qty: 420 ML | Refills: 0 | Status: SHIPPED | OUTPATIENT
Start: 2019-10-27

## 2019-10-27 RX ORDER — ONDANSETRON 4 MG/1
4 TABLET, ORALLY DISINTEGRATING ORAL EVERY 6 HOURS PRN
Qty: 30 TABLET | Refills: 0 | Status: SHIPPED | OUTPATIENT
Start: 2019-10-27

## 2019-10-27 RX ADMIN — KETOROLAC TROMETHAMINE 15 MG: 30 INJECTION INTRAMUSCULAR; INTRAVENOUS at 08:55

## 2019-10-27 RX ADMIN — ONDANSETRON 4 MG: 2 INJECTION INTRAMUSCULAR; INTRAVENOUS at 02:44

## 2019-10-27 RX ADMIN — SUCRALFATE 1000 MG: 1 SUSPENSION ORAL at 06:15

## 2019-10-27 RX ADMIN — LORAZEPAM 0.5 MG: 0.5 TABLET ORAL at 03:04

## 2019-10-27 RX ADMIN — ALUMINUM HYDROXIDE, MAGNESIUM HYDROXIDE, AND SIMETHICONE 30 ML: 200; 200; 20 SUSPENSION ORAL at 03:04

## 2019-10-27 RX ADMIN — HEPARIN SODIUM 5000 UNITS: 5000 INJECTION INTRAVENOUS; SUBCUTANEOUS at 06:15

## 2019-10-27 RX ADMIN — NICOTINE 1 PATCH: 21 PATCH, EXTENDED RELEASE TRANSDERMAL at 09:17

## 2019-10-27 RX ADMIN — KETOROLAC TROMETHAMINE 15 MG: 30 INJECTION INTRAMUSCULAR; INTRAVENOUS at 02:43

## 2019-10-27 RX ADMIN — ONDANSETRON 4 MG: 2 INJECTION INTRAMUSCULAR; INTRAVENOUS at 09:17

## 2019-10-27 RX ADMIN — PANTOPRAZOLE SODIUM 40 MG: 40 TABLET, DELAYED RELEASE ORAL at 06:15

## 2019-10-27 RX ADMIN — DICYCLOMINE HYDROCHLORIDE 20 MG: 10 CAPSULE ORAL at 06:15

## 2019-10-27 NOTE — INCIDENTAL FINDINGS
The following findings require follow up:  Radiographic finding   Finding: 3 mm noncalcified left upper lobe pulmonary nodule & focal areas of pleural thickening within the posterior aspect of the bilateral upper lobes may represent sequela of prior asbestos exposure      Follow up required: Yes   Follow up should be done within 1 month(s)    Please notify the following clinician to assist with the follow up:   PCP and pulmonologist

## 2019-10-27 NOTE — ASSESSMENT & PLAN NOTE
Patient reports daily marijuana use at bedtime   UDS (+) for THC  · Counseled on marijuana cessation

## 2019-10-27 NOTE — ASSESSMENT & PLAN NOTE
Questionable cannabinoid hyperemesis syndrome vs opioid withdrawal vs epigastric pain due to GERD vs gastroenteritis as patient had diarrhea, vomiting, and diaphoresis   · UDS (+) TSH and opioids, however, this was collected after patient received opioids in ED  · Discontinued Clonidine   · Supportive care with hydration and Zofran   · Counseled on marijuana cessation

## 2019-10-27 NOTE — ASSESSMENT & PLAN NOTE
Hst of gastritis   CTA chest abdomen pelvis:  Colonic diverticulosis without evidence of acute diverticulitis  Patient with a hst of abdominal pain and drug seeking behavior   Has had 20 ED in visits in [place] for abdominal pain since from July 2017   1 episode of vomiting in ED  None further     Lipase 395 -> 137  Concerned for possible opioid withdrawal    · Tolerated non ulcerogenic diet   · On discharge will continue Protonix 40 mg PO early morning with Carafate QID, Bentyl PRN, and Zofran PRN  · Recommend weight loss  · Recommend outpatient follow-up with PCP and GI

## 2019-10-27 NOTE — DISCHARGE SUMMARY
Discharge- [facility] 1965, 49 y o  male MRN: [id number]    Unit/Bed#: 2 [place] [address] [address] Encounter: [id number]    Primary Care Provider: Mary Roth MD   Date and time admitted to hospital: 10/26/2019  1:59 PM        * Epigastric pain  Assessment & Plan  Hst of gastritis   CTA chest abdomen pelvis:  Colonic diverticulosis without evidence of acute diverticulitis  Patient with a hst of abdominal pain and drug seeking behavior   Has had 20 ED in visits in [place] for abdominal pain since from July 2017   1 episode of vomiting in ED  None further  Lipase 395 -> 137  Concerned for possible opioid withdrawal    · Tolerated non ulcerogenic diet   · On discharge will continue Protonix 40 mg PO early morning with Carafate QID, Bentyl PRN, and Zofran PRN  · Recommend weight loss  · Recommend outpatient follow-up with PCP and GI     Nausea & vomiting  Assessment & Plan  Questionable cannabinoid hyperemesis syndrome vs opioid withdrawal vs epigastric pain due to GERD vs gastroenteritis as patient had diarrhea, vomiting, and diaphoresis   · UDS (+) TSH and opioids, however, this was collected after patient received opioids in ED  · Discontinued Clonidine   · Supportive care with hydration and Zofran   · Counseled on marijuana cessation    Chronic gastritis  Assessment & Plan  Continue Protonix 40 mg early morning and Carafate 1gm QID    Pleural thickening  Assessment & Plan  CTA chest and pelvis: 'Focal areas of pleural thickening within the posterior aspect of the bilateral upper lobes may represent sequela of prior asbestos exposure  Clinical correlation is recommended '  · Outpatient follow-up with PCP and pulmonary   · Smoking cessation education and counseling    Filling defect on imaging study  Assessment & Plan  CTA chest done pelvis: 'Collateral vessels in the left posterior chest with questionable narrowing/filling defect of the left subclavian vein   Consider further evaluation with vascular ultrasound to exclude filling defect/thrombus versus mixing artifact in this region '  · Patient with no discomfort or upper extremity swelling  · Obtain left upper extremity venous duplex as outpatient  · Follow-up with PCP    Pulmonary nodule  Assessment & Plan  CT chest: 3 mm noncalcified left upper lobe pulmonary nodule  Based on current Fleischner Society 2017 Guidelines on incidental pulmonary nodule, no routine follow-up is needed if the patient is considered low risk for lung cancer  If the patient is considered high risk for lung cancer, 12 month follow-up non-contrast chest CT is recommended  · Outpatient follow-up with PCP and pulmonary   · Nicotine cessation    Obesity  Assessment & Plan  Would benefit from weight loss  · A1c 6 2 and lipid panel, LDL 56, HDL 29, triglycerides 170    Marijuana abuse  Assessment & Plan  Patient reports daily marijuana use at bedtime  UDS (+) for THC  · Counseled on marijuana cessation      Nicotine dependence  Assessment & Plan  · Smoking cessation education and counseling  · Nicotine patch on discharge         Discharging Physician / Practitioner: JAEL Patel  PCP: José Manuel Santoro MD  Admission Date:   Admission Orders (From admission, onward)     Ordered        10/26/19 1709  Place in Observation  Once                   Discharge Date: 10/27/19    Resolved Problems  Date Reviewed: 10/27/2019    None          Consultations During Hospital Stay:  · None    Procedures Performed:   CTA chest/abdomen/pelvis:   1  3 mm noncalcified left upper lobe pulmonary nodule  Based on current Fleischner Society 2017 Guidelines on incidental pulmonary nodule, no routine follow-up is needed if the patient is considered low risk for lung cancer   If the patient is considered  high risk for lung cancer, 12 month follow-up non-contrast chest CT is recommended  2  Colonic diverticulosis without evidence of acute diverticulitis    3  Focal areas of pleural thickening within the posterior aspect of the bilateral upper lobes may represent sequela of prior asbestos exposure  Clinical correlation is recommended  4  Collateral vessels in the left posterior chest with questionable narrowing/filling defect of the left subclavian vein  Consider further evaluation with vascular ultrasound to exclude filling defect/thrombus versus mixing artifact in this region  5  Punctate 2 mm nonobstructing left renal calculus  CXR: No acute cardiopulmonary disease  Significant Findings / Test Results:   · Nausea, vomiting, loose stools, mild diaphoresis, abdominal pain     Incidental Findings:   · Left upper lobe pulmonary nodule   · Pleural thickening within the posterior aspect of the bilateral upper lobes may represent sequela of prior asbestos exposure  · Collateral vessels in the left posterior chest with questionable narrowing/filling defect of the left subclavian vein  Consider further evaluation with vascular ultrasound to exclude filling defect/thrombus versus mixing artifact in this region  · Punctate 2 mm nonobstructing left renal calculus    Test Results Pending at Discharge (will require follow up): · None     Outpatient Tests Requested:  · Follow-up with PCP, GI, pulmonary    Complications:  None    Reason for Admission: Epigastric pain     Hospital Course:     Mickey Canada is a 49 y o  male patient with a PMH including gastritis, abdominal pain, chronic nausea, and tobacco and marijuana dependence who originally presented to the hospital on 10/26/2019 due to the gastric pain and nausea and vomiting  Patient reported he was driving back to [place] from [place] when he stopped for food and developed epigastric pain followed by nausea and vomiting  Workup in the ED revealed tachycardia, tachypnea, hypertension  Patient had 1 episode of vomiting while in the ED  He was admitted for further evaluation and treatment  He was given IV hydration, IV antiemetics, and IV opioids in the ED  During admission we resumed Protonix daily, Carafate 4 times daily, scheduled Bentyl, and Zofran as needed  The patient's epigastric pain and nausea improved  He was able to tolerate a non ulcerogenic diet without nausea or vomiting  He is anxious for discharge home as he is feeling better  He will be discharged home with prescriptions for Protonix, Carafate, Bentyl, Zofran, nicotine patch, and a ProAir inhaler  He is advised to follow up with his primary care provider  He is also advised to follow up with Pulmonary for incidental findings including pleural thickening and a pulmonary nodule  He is also to follow up with gastroenteritis for his chronic abdominal pain, gastritis, and nausea  Additionally, he is to obtain a left upper extremity ultrasound to evaluate incidental finding of questionable narrowing/filling defect of the left subclavian vein  Please see above list of diagnoses and related plan for additional information  Condition at Discharge: stable     Discharge Day Visit / Exam:     Subjective: Tolerated breakfast and lunch without nausea or vomiting  No further loose stool  Feels much better today compared to yesterday  Denies headache, dizziness, lightheadedness, chest pain, shortness of breath  Wants to be discharged home before 1pm    Vitals: Blood Pressure: 125/78 (10/27/19 0803)  Pulse: 86 (10/27/19 0803)  Temperature: 98 2 °F (36 8 °C) (10/27/19 0803)  Temp Source: Oral (10/27/19 0803)  Respirations: 18 (10/27/19 0803)  Height: 5' 8" (172 7 cm) (10/26/19 1849)  Weight - Scale: 118 kg (261 lb 0 4 oz) (10/26/19 1849)  SpO2: 95 % (10/27/19 0803)  Exam:   Physical Exam   Constitutional: He is oriented to person, place, and time  He appears well-developed  No distress  Pleasant gentleman resting comfortably at edge of bed on room air eating lunch    HENT:   Head: Normocephalic  Neck: Normal range of motion  Cardiovascular: Normal rate and regular rhythm     Pulmonary/Chest: Effort normal and breath sounds normal  No respiratory distress  He has no wheezes  He has no rhonchi  He has no rales  Abdominal: Soft  Bowel sounds are normal  He exhibits no distension  There is no tenderness  Obese    Musculoskeletal: Normal range of motion  He exhibits no edema or tenderness  Neurological: He is alert and oriented to person, place, and time  He has normal reflexes  Skin: Skin is warm and dry  No rash noted  He is not diaphoretic  Psychiatric: He has a normal mood and affect  Judgment normal    Nursing note and vitals reviewed  Discussion with Family: None    Discharge instructions/Information to patient and family:   See after visit summary for information provided to patient and family  Provisions for Follow-Up Care:  See after visit summary for information related to follow-up care and any pertinent home health orders  Disposition:     Home    For Discharges to [facility] SN[facility]:   · Not Applicable to this Patient - Not Applicable to this Patient    Planned Readmission: None     Discharge Statement:  I spent > 30 minutes discharging the patient  This time was spent on the day of discharge  I had direct contact with the patient on the day of discharge  Greater than 50% of the total time was spent examining patient, answering all patient questions, arranging and discussing plan of care with patient as well as directly providing post-discharge instructions  Additional time then spent on discharge activities  Discharge Medications:  See after visit summary for reconciled discharge medications provided to patient and family        ** Please Note: This note has been constructed using a voice recognition system **

## 2019-10-27 NOTE — ASSESSMENT & PLAN NOTE
CTA chest done pelvis: 'Collateral vessels in the left posterior chest with questionable narrowing/filling defect of the left subclavian vein   Consider further evaluation with vascular ultrasound to exclude filling defect/thrombus versus mixing artifact in this region '  · Patient with no discomfort or upper extremity swelling  · Obtain left upper extremity venous duplex as outpatient  · Follow-up with PCP

## 2019-10-27 NOTE — DISCHARGE INSTRUCTIONS
Abdominal Pain:  · Take Protonix first thing in the morning on an empty stomach   · Take Carafate 4x/day after meals and before bedtime   · Take Zofran for nausea  · Take Bentyl for stomach cramps   · Recommend weight loss and increased exercise  · Follow-up with PCP and gastroenterologist     Asthma:  · Continue your Albuterol Inhaler  · Nicotine patch daily in place of cigarettes   · Follow-up with pulmonologist as you had pleural thickening and pulmonary nodule on CT of the chest     Filling defect on imaging:   CTA chest: 'Collateral vessels in the left posterior chest with questionable narrowing/filling defect of the left subclavian vein  Consider further evaluation with vascular ultrasound to exclude filling defect/thrombus versus mixing artifact in this region '  · Obtain left upper extremity venous ultrasound as an outpatient  · Follow-up with PCP        Diet for Stomach Ulcers and Gastritis   WHAT YOU NEED TO KNOW:   What is a diet for stomach ulcers and gastritis? A diet for ulcers and gastritis is a meal plan that limits foods that irritate your stomach  Certain foods may worsen symptoms such as stomach pain, bloating, heartburn, or indigestion  Which foods should I limit or avoid? You may need to avoid acidic, spicy, or high-fat foods  Not all foods affect everyone the same way  You will need to learn which foods worsen your symptoms and limit those foods   The following are some foods that may worsen ulcer or gastritis symptoms:  · Beverages:      ¨ Whole milk and chocolate milk    ¨ Hot cocoa and cola    ¨ Any beverage with caffeine    ¨ Regular and decaffeinated coffee    ¨ Peppermint and spearmint tea    ¨ Green and black tea, with or without caffeine    ¨ Orange and grapefruit juices    ¨ Drinks that contain alcohol    · Spices and seasonings:      ¨ Black and red pepper    ¨ Chili powder    ¨ Mustard seed and nutmeg    · Other foods:      ¨ Dairy foods made from whole milk or cream    ¨ Chocolate    ¨ Spicy or strongly flavored cheeses, such as jalapeno or black pepper    ¨ Highly seasoned, high-fat meats, such as sausage, salami, shaffer, ham, and cold cuts    ¨ Hot chiles and peppers    ¨ Tomato products, such as tomato paste, tomato sauce, or tomato juice  Which foods can I eat and drink? Eat a variety of healthy foods from all the food groups  Eat fruits, vegetables, whole grains, and fat-free or low-fat dairy foods  Whole grains include whole-wheat breads, cereals, pasta, and brown rice  Choose lean meats, poultry (chicken and turkey), fish, beans, eggs, and nuts  A healthy meal plan is low in unhealthy fats, salt, and added sugar  Healthy fats include olive oil and canola oil  Ask your dietitian for more information about a healthy meal plan  What other guidelines may be helpful? · Do not eat right before bedtime  Stop eating at least 2 hours before bedtime  · Eat small, frequent meals  Your stomach may tolerate small, frequent meals better than large meals  CARE AGREEMENT:   You have the right to help plan your care  Discuss treatment options with your caregivers to decide what care you want to receive  You always have the right to refuse treatment  The above information is an  only  It is not intended as medical advice for individual conditions or treatments  Talk to your doctor, nurse or pharmacist before following any medical regimen to see if it is safe and effective for you  © 2017 [employer] Information is for End User's use only and may not be sold, redistributed or otherwise used for commercial purposes  All illustrations and images included in CareNotes® are the copyrighted property of A D A M , Inc  or Vikram Nickerson

## 2019-10-27 NOTE — ASSESSMENT & PLAN NOTE
CTA chest and pelvis: 'Focal areas of pleural thickening within the posterior aspect of the bilateral upper lobes may represent sequela of prior asbestos exposure   Clinical correlation is recommended '  · Outpatient follow-up with PCP and pulmonary   · Smoking cessation education and counseling

## 2019-10-27 NOTE — RESPIRATORY THERAPY NOTE
RT Protocol Note  [facility] 49 y o  male MRN: [id number]  Unit/Bed#: 2 [address] Encounter: [id number]    Assessment    Principal Problem:    Epigastric pain  Active Problems:    Chronic gastritis    Pulmonary nodule    Nausea & vomiting    Nicotine dependence    Marijuana abuse    Filling defect on imaging study    Pleural thickening    Obesity      Home Pulmonary Medications:  Albuterol       Past Medical History:   Diagnosis Date    Arthritis     Asthma     Hypertension      Social History     Socioeconomic History    Marital status: Single     Spouse name: None    Number of children: None    Years of education: None    Highest education level: None   Occupational History    None   Social Needs    Financial resource strain: None    Food insecurity:     Worry: None     Inability: None    Transportation needs:     Medical: None     Non-medical: None   Tobacco Use    Smoking status: Current Every Day Smoker     Packs/day: 1 00     Types: Cigarettes    Smokeless tobacco: Current User   Substance and Sexual Activity    Alcohol use: Never     Frequency: Never    Drug use: Yes     Types: Marijuana     Comment: medicinal marijuana daily    Sexual activity: None   Lifestyle    Physical activity:     Days per week: None     Minutes per session: None    Stress: None   Relationships    Social connections:     Talks on phone: None     Gets together: None     Attends Yazdanism service: None     Active member of club or organization: None     Attends meetings of clubs or organizations: None     Relationship status: None    Intimate partner violence:     Fear of current or ex partner: None     Emotionally abused: None     Physically abused: None     Forced sexual activity: None   Other Topics Concern    None   Social History Narrative    None       Subjective         Objective    Physical Exam:   Assessment Type: Pre-treatment  General Appearance: Alert, Awake  Respiratory Pattern: Normal  Chest Assessment: Chest expansion symmetrical  Bilateral Breath Sounds: Diminished, Expiratory wheezes  R Breath Sounds: Diminished  L Breath Sounds: Diminished, Expiratory wheezes  Cough: Non-productive    Vitals:  Blood pressure 135/90, pulse 94, temperature 98 1 °F (36 7 °C), resp  rate 20, height 5' 8" (1 727 m), weight 118 kg (261 lb 0 4 oz), SpO2 97 %                Plan    Respiratory Plan: Home Bronchodilator Patient pathway

## 2019-10-27 NOTE — INCIDENTAL FINDINGS
The following findings require follow up:  Radiographic finding   Finding: Collateral vessels in the left posterior chest with questionable narrowing/filling defect of the left subclavian vein  Consider further evaluation with vascular ultrasound to exclude filling defect/thrombus versus mixing   artifact in this region     Follow up required: Yes with LUE US   Follow up should be done within 1 week(s)    Please notify the following clinician to assist with the follow up:   PCP after obtaining US

## 2019-10-27 NOTE — ASSESSMENT & PLAN NOTE
CT chest: 3 mm noncalcified left upper lobe pulmonary nodule  Based on current Fleischner Society 2017 Guidelines on incidental pulmonary nodule, no routine follow-up is needed if the patient is considered low risk for lung cancer  If the patient is considered high risk for lung cancer, 12 month follow-up non-contrast chest CT is recommended    · Outpatient follow-up with PCP and pulmonary   · Nicotine cessation

## 2019-10-27 NOTE — UTILIZATION REVIEW
Initial Clinical Review    Admission: Date/Time/Statement:    Admission Orders (From admission, onward)     Ordered        10/26/19 1709  Place in Observation  Once                   Orders Placed This Encounter   Procedures    Place in Observation     Standing Status:   Standing     Number of Occurrences:   1     Order Specific Question:   Admitting Physician     Answer:   Angelita Crowe     Order Specific Question:   Level of Care     Answer:   Med Surg [16]     ED Arrival Information     Expected Arrival Acuity Means of Arrival Escorted By Service Admission Type    - 10/26/2019 13:58 Emergent [facility]ulance [address] Emergency    Arrival Complaint    Chest pain        Chief Complaint   Patient presents with    Abdominal Pain     c/o 20 minute onset of abdominal pain radiating up into his chest   squad states "patient was passing out for a few seconds at a time" in the ambulance  Patient grunting loudly and hyperventilating on arrival   also c/o nausea   Chest Pain     Assessment/Plan:   60-year-old male reportedly lives [place] it was driving home from [place] visiting his son and he developed severe epigastric abdominal pain that was associated with nausea but no vomiting  Patient was brought in by squad, the patient denies any chest pain or shortness of breath  Patient has a very vague history, he states he has had similar symptoms in the past has been seen in hospitals in [place] and in [place] for reported the a ruptured diverticulitis, possibly kidney stones he states he has history of severe reflux  Patient states he had coffee this morning when he started driving and he knew he was going to have a problem  The pain did not start right away if the coffee but soon afterwards  Patient is hyperventilating upon entrance to the emergency room    Epigastric pain  Assessment & Plan  CTA chest abdomen pelvis  Patient with a hst of abdominal pain and drug seeking behavior   Has had 20 ED in visits in [place] for abdominal pain since from July 2017   1 episode of vomiting in ED  Lipase 395  Possible opioid withdrawal   · Full liquids  · IVF  · Scheduled Bentyl and Clonidine 0 1 mg po q8h  · Ativan PRN  · Check a UA, urine drug screen, PVR  · Check lipase, amylase, and CMP in am   · Protonix 40 BID and Carafate 1gm QID   · Supportive care with IVF, IV Zofran, IV Phenergan if no relief with Zofran, and IV Toradol PRN mod to severe pain      Nausea & vomiting  Assessment & Plan  Questionable cannabinoid hyperemesis syndrome vs opioid withdrawal as patient had diarrhea, vomiting, and diaphoresis   · Check a urine drug screen  · Clonidine 0 1 mg q8h, IVF, Ativan PRN   · Supportive care  · Counseled on marijuana cessation     Chronic gastritis  Assessment & Plan  Continue Protonix 40 mg BID with meals and Carafate 1gm QID     Filling defect on imaging study  Assessment & Plan  CTA chest done pelvis  · Patient with no discomfort or upper extremity swelling  · Will check a D-dimer  · Obtain left upper extremity venous duplex inpatient versus outpatient    ED Triage Vitals [10/26/19 1407]   Temperature Pulse Respirations Blood Pressure SpO2   (!) 97 °F (36 1 °C) (!) 110 (!) 36 (!) 151/110 96 %      Temp Source Heart Rate Source Patient Position - Orthostatic VS BP Location FiO2 (%)   Tympanic Monitor Lying Right arm --      Pain Score       Worst Possible Pain        Wt Readings from Last 1 Encounters:   10/26/19 118 kg (261 lb 0 4 oz)     Additional Vital Signs:   Date/Time  Temp  Pulse  Resp  BP  MAP (mmHg)  SpO2  O2 Device  Patient Position - Orthostatic VS   10/26/19 23:16:16  98 4 °F (36 9 °C)  79  16  119/84  96  95 %  None (Room air)     10/26/19 2037    94  20      97 %  None (Room air)     10/26/19 18:20:33  98 1 °F (36 7 °C)  96  18  135/90  105  95 %       Pertinent Labs/Diagnostic Test Results:   Results from last 7 days   Lab Units 10/27/19  4044 10/26/19  1410   WBC Thousand/uL 9 84 9 84   HEMOGLOBIN g/dL 12 2 14 0   HEMATOCRIT % 39 7 43 9   PLATELETS Thousands/uL 231 291   NEUTROS ABS Thousands/µL  --  5 12     Results from last 7 days   Lab Units 10/27/19  0535 10/26/19  1410   SODIUM mmol/L 138 136   POTASSIUM mmol/L 4 5 4 2   CHLORIDE mmol/L 103 102   CO2 mmol/L 33* 27   ANION GAP mmol/L 2* 7   BUN mg/dL 15 20   CREATININE mg/dL 0 93 0 88   EGFR ml/min/1 73sq m 93 98   CALCIUM mg/dL 8 2* 8 7   MAGNESIUM mg/dL 2 4  --    PHOSPHORUS mg/dL 2 8  --      Results from last 7 days   Lab Units 10/27/19  0535 10/26/19  1410   AST U/L 23 20   ALT U/L 24 32   ALK PHOS U/L 64 82   TOTAL PROTEIN g/dL 6 2* 7 3   ALBUMIN g/dL 3 1* 3 7   TOTAL BILIRUBIN mg/dL 0 40 0 30     Results from last 7 days   Lab Units 10/27/19  0535 10/26/19  1410   GLUCOSE RANDOM mg/dL 127 119     Results from last 7 days   Lab Units 10/26/19  1410   HEMOGLOBIN A1C % 6 2   EAG mg/dl 131     Results from last 7 days   Lab Units 10/26/19  1410   TROPONIN I ng/mL <0 02     Results from last 7 days   Lab Units 10/26/19  1410   PROTIME seconds 11 0   INR  1 02   PTT seconds 27     Results from last 7 days   Lab Units 10/27/19  0535   TSH 3RD GENERATON uIU/mL 1  101     Results from last 7 days   Lab Units 10/27/19  0535 10/26/19  1410   LIPASE u/L 137 395*   AMYLASE IU/L 64  --      Results from last 7 days   Lab Units 10/26/19  2006   CLARITY UA  Clear   COLOR UA  Yellow   SPEC GRAV UA  1 010   PH UA  7 5   GLUCOSE UA mg/dl Negative   KETONES UA mg/dl Negative   BLOOD UA  Small*   PROTEIN UA mg/dl Negative   NITRITE UA  Negative   BILIRUBIN UA  Negative   UROBILINOGEN UA E U /dl 0 2   LEUKOCYTES UA  Negative   WBC UA /hpf 0-1*   RBC UA /hpf 4-10*   BACTERIA UA /hpf Occasional   EPITHELIAL CELLS WET PREP /hpf Occasional   MUCUS THREADS  Occasional*     Results from last 7 days   Lab Units 10/26/19  2006   AMPH/METH  Negative   BARBITURATE UR  Negative   BENZODIAZEPINE UR  Negative   COCAINE UR Negative   METHADONE URINE  Negative   OPIATE UR  Positive*   PCP UR  Negative   THC UR  Positive*     CT CHEST, A/P=1  3 mm noncalcified left upper lobe pulmonary nodule  2  Colonic diverticulosis without evidence of acute diverticulitis  3  Focal areas of pleural thickening within the posterior aspect of the bilateral upper lobes may represent sequela of prior asbestos exposure  Clinical correlation is recommended  4  Collateral vessels in the left posterior chest with questionable narrowing/filling defect of the left subclavian vein (series 2 image 5 through 7)  Consider further evaluation with vascular ultrasound to exclude filling defect/thrombus versus mixing artifact in this region  5  Punctate 2 mm nonobstructing left renal calculus    ED Treatment:   Medication Administration from 10/26/2019 1358 to 10/26/2019 1810       Date/Time Order Dose Route     10/26/2019 1411 sodium chloride 0 9 % bolus 1,000 mL 1,000 mL Intravenous     10/26/2019 1413 ketorolac (TORADOL) injection 30 mg 30 mg Intravenous     10/26/2019 1412 ondansetron (ZOFRAN) injection 4 mg 4 mg Intravenous     10/26/2019 1511 promethazine (PHENERGAN) injection 12 5 mg 12 5 mg Intramuscular     10/26/2019 1522 iohexol (OMNIPAQUE) 350 MG/ML injection (MULTI-DOSE) 100 mL 100 mL Intravenous     10/26/2019 1602 morphine (PF) 4 mg/mL injection 4 mg 4 mg Intravenous     10/26/2019 1722 HYDROmorphone (DILAUDID) injection 1 mg 1 mg Intravenous     10/26/2019 1720 metoclopramide (REGLAN) injection 10 mg 10 mg Intravenous        Past Medical History:   Diagnosis Date    Arthritis     Asthma     Hypertension    Admitting Diagnosis: Chest pain [R07 9]  Abdominal pain [R10 9]  Nausea & vomiting [R11 2]  Age/Sex: 49 y o  male  Admission Orders:  MED SURG  CONSULT GI  Medications:  amLODIPine 5 mg Oral Daily   dicyclomine 20 mg Oral 4x Daily (AC & HS)   heparin (porcine) 5,000 Units Subcutaneous Q8H [address]   nicotine 1 patch Transdermal Daily   pantoprazole 40 mg Oral Early Morning   sucralfate 1,000 mg Oral 4x Daily (AC & HS)     sodium chloride 100 mL/hr Intravenous Continuous     acetaminophen 650 mg Oral Q6H PRN   albuterol 2 5 mg Nebulization Q6H PRN   aluminum-magnesium hydroxide-simethicone 30 mL Oral Q4H PRN   ketorolac 15 mg Intravenous Q6H PRN   LORazepam 0 5 mg Oral Q8H PRN   ondansetron 4 mg Intravenous Q6H PRN   polyethylene glycol 17 g Oral Daily PRN   promethazine 12 5 mg Intravenous Q8H PRN   Network Utilization Review Department  [email or web address]  ATTENTION: Please call with any questions or concerns to [phone] and carefully listen to the prompts so that you are directed to the right person  All voicemails are confidential   [facility] all requests for admission clinical reviews, approved or denied determinations and any other requests to dedicated fax number below belonging to the campus where the patient is receiving treatment    FACILITY NAME UR FAX NUMBER   ADMISSION DENIALS (Administrative/Medical Necessity) [address] [facility] (Maternity/NICU/Pediatrics) [phone]   [facility] [address] [facility] [phone]   [address] [facility]  [place] [address] [facility] [phone]   [place]oonam Le [address] [facility] [address] [facility] Tish Dhaliwal [phone]

## 2019-10-27 NOTE — NURSING NOTE
Iv access removed, belongings gathered by patient, discharge education provided to patient, patient stated understanding, left floor via walking

## 2019-10-28 LAB
ATRIAL RATE: 108 BPM
P AXIS: 53 DEGREES
PR INTERVAL: 154 MS
QRS AXIS: 26 DEGREES
QRSD INTERVAL: 92 MS
QT INTERVAL: 336 MS
QTC INTERVAL: 450 MS
T WAVE AXIS: 35 DEGREES
VENTRICULAR RATE: 108 BPM

## 2019-10-28 PROCEDURE — 93010 ELECTROCARDIOGRAM REPORT: CPT | Performed by: INTERNAL MEDICINE

## 2020-06-28 ENCOUNTER — HOSPITAL ENCOUNTER (EMERGENCY)
Dept: HOSPITAL 62 - ER | Age: 55
Discharge: LEFT BEFORE DISCHARGE | End: 2020-06-28
Payer: COMMERCIAL

## 2020-06-28 VITALS — SYSTOLIC BLOOD PRESSURE: 121 MMHG | DIASTOLIC BLOOD PRESSURE: 98 MMHG

## 2020-06-28 DIAGNOSIS — R10.10: ICD-10-CM

## 2020-06-28 DIAGNOSIS — R10.12: ICD-10-CM

## 2020-06-28 DIAGNOSIS — Z53.20: Primary | ICD-10-CM

## 2020-06-28 DIAGNOSIS — R11.2: ICD-10-CM

## 2020-06-28 DIAGNOSIS — E66.9: ICD-10-CM

## 2020-06-28 DIAGNOSIS — R19.7: ICD-10-CM

## 2020-06-28 PROCEDURE — 99281 EMR DPT VST MAYX REQ PHY/QHP: CPT

## 2020-06-28 NOTE — ER DOCUMENT REPORT
ED Medical Screen (RME)





- General


Chief Complaint: Abdominal Pain


Stated Complaint: UPPER ABDOMINAL PAIN


Time Seen by Provider: 06/28/20 13:43


Information source: Patient


Notes: 





Patient presents complaining of left upper quadrant abdominal pain with nausea 

vomiting diarrhea.  Patient states he is vomited twice and had diarrhea x1 

episode.  Patient states he has a history of pancreatitis and was worried about 

this today.  Patient also reports history of acid reflux.  Patient denies any 

fever cough or cold symptoms.





I have greeted and performed a rapid initial assessment of this patient.  A 

comprehensive ED assessment and evaluation of the patient, analysis of test 

results and completion of the medical decision making process will be conducted 

by additional ED providers.





- Related Data


Allergies/Adverse Reactions: 


                                        





No Known Allergies Allergy (Unverified 06/28/20 13:42)


   











Past Medical History





- Social History


Chew tobacco use (# tins/day): No


Frequency of alcohol use: None


Drug Abuse: None





Physical Exam





- Vital signs


Vitals: 





                                        











Temp Pulse Resp BP Pulse Ox


 


 98.4 F   109 H  18   121/98 H  100 


 


 06/28/20 12:51  06/28/20 12:51  06/28/20 12:51  06/28/20 12:51  06/28/20 12:51














- General


General appearance: Alert, Anxious


Notes: 





Obese abdomen, left upper quadrant, left side abdominal tenderness





Course





- Vital Signs


Vital signs: 





                                        











Temp Pulse Resp BP Pulse Ox


 


 98.4 F   109 H  18   121/98 H  100 


 


 06/28/20 12:51  06/28/20 12:51  06/28/20 12:51  06/28/20 12:51  06/28/20 12:51